# Patient Record
Sex: FEMALE | Race: WHITE | NOT HISPANIC OR LATINO | URBAN - METROPOLITAN AREA
[De-identification: names, ages, dates, MRNs, and addresses within clinical notes are randomized per-mention and may not be internally consistent; named-entity substitution may affect disease eponyms.]

---

## 2019-04-05 ENCOUNTER — APPOINTMENT (RX ONLY)
Dept: URBAN - METROPOLITAN AREA CLINIC 38 | Facility: CLINIC | Age: 64
Setting detail: DERMATOLOGY
End: 2019-04-05

## 2019-04-05 ENCOUNTER — APPOINTMENT (RX ONLY)
Dept: URBAN - METROPOLITAN AREA CLINIC 37 | Facility: CLINIC | Age: 64
Setting detail: DERMATOLOGY
End: 2019-04-05

## 2019-04-05 DIAGNOSIS — L72.0 EPIDERMAL CYST: ICD-10-CM

## 2019-04-05 DIAGNOSIS — L73.8 OTHER SPECIFIED FOLLICULAR DISORDERS: ICD-10-CM

## 2019-04-05 PROBLEM — L55.1 SUNBURN OF SECOND DEGREE: Status: ACTIVE | Noted: 2019-04-05

## 2019-04-05 PROBLEM — Z85.828 PERSONAL HISTORY OF OTHER MALIGNANT NEOPLASM OF SKIN: Status: ACTIVE | Noted: 2019-04-05

## 2019-04-05 PROBLEM — L23.7 ALLERGIC CONTACT DERMATITIS DUE TO PLANTS, EXCEPT FOOD: Status: ACTIVE | Noted: 2019-04-05

## 2019-04-05 PROBLEM — L70.0 ACNE VULGARIS: Status: ACTIVE | Noted: 2019-04-05

## 2019-04-05 PROBLEM — I10 ESSENTIAL (PRIMARY) HYPERTENSION: Status: ACTIVE | Noted: 2019-04-05

## 2019-04-05 PROCEDURE — ? INTRALESIONAL KENALOG

## 2019-04-05 PROCEDURE — 99024 POSTOP FOLLOW-UP VISIT: CPT | Mod: 25

## 2019-04-05 PROCEDURE — ? COUNSELING

## 2019-04-05 ASSESSMENT — LOCATION ZONE DERM
LOCATION ZONE: FACE
LOCATION ZONE: FACE

## 2019-04-05 ASSESSMENT — LOCATION DETAILED DESCRIPTION DERM
LOCATION DETAILED: RIGHT INFERIOR MEDIAL FOREHEAD
LOCATION DETAILED: RIGHT INFERIOR LATERAL MALAR CHEEK
LOCATION DETAILED: RIGHT INFERIOR MEDIAL FOREHEAD
LOCATION DETAILED: RIGHT INFERIOR LATERAL MALAR CHEEK

## 2019-04-05 ASSESSMENT — LOCATION SIMPLE DESCRIPTION DERM
LOCATION SIMPLE: RIGHT CHEEK
LOCATION SIMPLE: RIGHT FOREHEAD
LOCATION SIMPLE: RIGHT FOREHEAD
LOCATION SIMPLE: RIGHT CHEEK

## 2019-04-05 NOTE — PROCEDURE: INTRALESIONAL KENALOG
Administered By (Optional): Dr. Chapin
Medical Necessity Clause: This procedure was medically necessary because the lesions that were treated were:
Expiration Date For Kenalog (Optional): 05/2020
Kenalog Preparation: Kenalog
Ndc# For Kenalog Only: [id number]
Consent: The risks of atrophy were reviewed with the patient.
Lot # For Kenalog (Optional): [id number]
X Size Of Lesion In Cm (Optional): 0
Concentration Of Kenalog Solution Injected (Mg/Ml): 20.0
Detail Level: Detailed
Include Z78.9 (Other Specified Conditions Influencing Health Status) As An Associated Diagnosis?: No
Total Volume (Ccs): 0.3

## 2019-04-05 NOTE — PROCEDURE: INTRALESIONAL KENALOG
Ndc# For Kenalog Only: [id number]
Kenalog Preparation: Kenalog
Medical Necessity Clause: This procedure was medically necessary because the lesions that were treated were:
Include Z78.9 (Other Specified Conditions Influencing Health Status) As An Associated Diagnosis?: No
X Size Of Lesion In Cm (Optional): 0
Administered By (Optional): Dr. Pan
Consent: The risks of atrophy were reviewed with the patient.
Expiration Date For Kenalog (Optional): 05/2020
Detail Level: Detailed
Concentration Of Kenalog Solution Injected (Mg/Ml): 20.0
Total Volume (Ccs): 0.3
Lot # For Kenalog (Optional): [id number]

## 2019-04-25 ENCOUNTER — APPOINTMENT (RX ONLY)
Dept: URBAN - METROPOLITAN AREA CLINIC 37 | Facility: CLINIC | Age: 64
Setting detail: DERMATOLOGY
End: 2019-04-25

## 2019-04-25 ENCOUNTER — APPOINTMENT (RX ONLY)
Dept: URBAN - METROPOLITAN AREA CLINIC 38 | Facility: CLINIC | Age: 64
Setting detail: DERMATOLOGY
End: 2019-04-25

## 2019-04-25 DIAGNOSIS — L72.0 EPIDERMAL CYST: ICD-10-CM

## 2019-04-25 PROCEDURE — ? INTRALESIONAL KENALOG

## 2019-04-25 PROCEDURE — 99024 POSTOP FOLLOW-UP VISIT: CPT

## 2019-04-25 PROCEDURE — ? COUNSELING

## 2019-04-25 ASSESSMENT — LOCATION SIMPLE DESCRIPTION DERM
LOCATION SIMPLE: RIGHT CHEEK
LOCATION SIMPLE: RIGHT CHEEK

## 2019-04-25 ASSESSMENT — LOCATION ZONE DERM
LOCATION ZONE: FACE
LOCATION ZONE: FACE

## 2019-04-25 ASSESSMENT — LOCATION DETAILED DESCRIPTION DERM
LOCATION DETAILED: RIGHT CENTRAL MALAR CHEEK
LOCATION DETAILED: RIGHT CENTRAL MALAR CHEEK

## 2019-04-25 NOTE — PROCEDURE: INTRALESIONAL KENALOG
X Size Of Lesion In Cm (Optional): 0
Consent: The risks of atrophy were reviewed with the patient.
Ndc# For Kenalog Only: [id number]
Expiration Date For Kenalog (Optional): 08/2019
Total Volume (Ccs): 0.1
Medical Necessity Clause: This procedure was medically necessary because the lesions that were treated were:
Kenalog Preparation: Kenalog
Administered By (Optional): Dr. Chapin
Lot # For Kenalog (Optional): [id number]
Detail Level: Detailed
Concentration Of Kenalog Solution Injected (Mg/Ml): 40.0
Include Z78.9 (Other Specified Conditions Influencing Health Status) As An Associated Diagnosis?: No

## 2019-04-25 NOTE — PROCEDURE: INTRALESIONAL KENALOG
Kenalog Preparation: Kenalog
Administered By (Optional): Dr. Pan
Consent: The risks of atrophy were reviewed with the patient.
Total Volume (Ccs): 0.1
Detail Level: Detailed
Concentration Of Kenalog Solution Injected (Mg/Ml): 40.0
X Size Of Lesion In Cm (Optional): 0
Include Z78.9 (Other Specified Conditions Influencing Health Status) As An Associated Diagnosis?: No
Ndc# For Kenalog Only: [id number]
Lot # For Kenalog (Optional): [id number]
Medical Necessity Clause: This procedure was medically necessary because the lesions that were treated were:
Expiration Date For Kenalog (Optional): 08/2019

## 2020-03-05 ENCOUNTER — APPOINTMENT (RX ONLY)
Dept: URBAN - METROPOLITAN AREA CLINIC 37 | Facility: CLINIC | Age: 65
Setting detail: DERMATOLOGY
End: 2020-03-05

## 2020-03-05 ENCOUNTER — APPOINTMENT (RX ONLY)
Dept: URBAN - METROPOLITAN AREA CLINIC 38 | Facility: CLINIC | Age: 65
Setting detail: DERMATOLOGY
End: 2020-03-05

## 2020-03-05 DIAGNOSIS — L72.0 EPIDERMAL CYST: ICD-10-CM

## 2020-03-05 PROCEDURE — ? COUNSELING

## 2020-03-05 PROCEDURE — ? INTRALESIONAL KENALOG

## 2020-03-05 PROCEDURE — 99024 POSTOP FOLLOW-UP VISIT: CPT

## 2020-03-05 ASSESSMENT — LOCATION DETAILED DESCRIPTION DERM
LOCATION DETAILED: RIGHT INFERIOR CENTRAL MALAR CHEEK
LOCATION DETAILED: RIGHT INFERIOR CENTRAL MALAR CHEEK

## 2020-03-05 ASSESSMENT — LOCATION SIMPLE DESCRIPTION DERM
LOCATION SIMPLE: RIGHT CHEEK
LOCATION SIMPLE: RIGHT CHEEK

## 2020-03-05 ASSESSMENT — LOCATION ZONE DERM
LOCATION ZONE: FACE
LOCATION ZONE: FACE

## 2020-03-05 NOTE — PROCEDURE: INTRALESIONAL KENALOG
Lot # For Kenalog (Optional): [id number]
X Size Of Lesion In Cm (Optional): 0
Medical Necessity Clause: This procedure was medically necessary because the lesions that were treated were:
Concentration Of Kenalog Solution Injected (Mg/Ml): 40.0
Administered By (Optional): Dr. Pan
Ndc# For Kenalog Only: [id number]
Detail Level: Detailed
Include Z78.9 (Other Specified Conditions Influencing Health Status) As An Associated Diagnosis?: No
Consent: The risks of atrophy were reviewed with the patient.
Total Volume (Ccs): 1.5
Kenalog Preparation: Kenalog
Expiration Date For Kenalog (Optional): 08/2021

## 2020-03-05 NOTE — PROCEDURE: INTRALESIONAL KENALOG
Medical Necessity Clause: This procedure was medically necessary because the lesions that were treated were:
Total Volume (Ccs): 1.5
X Size Of Lesion In Cm (Optional): 0
Consent: The risks of atrophy were reviewed with the patient.
Lot # For Kenalog (Optional): [id number]
Kenalog Preparation: Kenalog
Expiration Date For Kenalog (Optional): 08/2021
Include Z78.9 (Other Specified Conditions Influencing Health Status) As An Associated Diagnosis?: No
Ndc# For Kenalog Only: [id number]
Concentration Of Kenalog Solution Injected (Mg/Ml): 40.0
Detail Level: Detailed
Administered By (Optional): Dr. Chapin

## 2020-04-17 ENCOUNTER — APPOINTMENT (RX ONLY)
Dept: URBAN - METROPOLITAN AREA CLINIC 38 | Facility: CLINIC | Age: 65
Setting detail: DERMATOLOGY
End: 2020-04-17

## 2020-04-17 ENCOUNTER — APPOINTMENT (RX ONLY)
Dept: URBAN - METROPOLITAN AREA CLINIC 37 | Facility: CLINIC | Age: 65
Setting detail: DERMATOLOGY
End: 2020-04-17

## 2020-04-17 DIAGNOSIS — L72.0 EPIDERMAL CYST: ICD-10-CM

## 2020-04-17 PROCEDURE — ? INCISION AND DRAINAGE

## 2020-04-17 PROCEDURE — 10060 I&D ABSCESS SIMPLE/SINGLE: CPT

## 2020-04-17 ASSESSMENT — LOCATION DETAILED DESCRIPTION DERM
LOCATION DETAILED: RIGHT CENTRAL MALAR CHEEK
LOCATION DETAILED: RIGHT CENTRAL MALAR CHEEK

## 2020-04-17 ASSESSMENT — LOCATION SIMPLE DESCRIPTION DERM
LOCATION SIMPLE: RIGHT CHEEK
LOCATION SIMPLE: RIGHT CHEEK

## 2020-04-17 ASSESSMENT — LOCATION ZONE DERM
LOCATION ZONE: FACE
LOCATION ZONE: FACE

## 2020-04-17 NOTE — PROCEDURE: MIPS QUALITY
Detail Level: Detailed
Quality 431: Preventive Care And Screening: Unhealthy Alcohol Use - Screening: Patient screened for unhealthy alcohol use using a single question and scores less than 2 times per year
Quality 226: Preventive Care And Screening: Tobacco Use: Screening And Cessation Intervention: Patient screened for tobacco use and is an ex/non-smoker
Quality 47: Advance Care Plan: Advance Care Planning discussed and documented in the medical record; patient did not wish or was not able to name a surrogate decision maker or provide an advance care plan.
Quality 130: Documentation Of Current Medications In The Medical Record: Current Medications Documented
Quality 110: Preventive Care And Screening: Influenza Immunization: Influenza Immunization previously received during influenza season

## 2020-04-17 NOTE — PROCEDURE: INCISION AND DRAINAGE
Consent was obtained and risks were reviewed including but not limited to delayed wound healing, infection, need for multiple I and D's, and pain.
Drainage Type?: cyst-like
Curette: Yes
Drainage Amount?: minimal
Method: 15 blade
Wound Care: Petrolatum
Include Sutures?: No
Epidermal Closure: simple interrupted
Detail Level: Detailed
Anesthesia Type: 1% lidocaine with epinephrine
Curette Text (Optional): After the contents were expressed a curette was used to partially remove the cyst wall.
Suture Text: The incision was partially closed with
Dressing: dry sterile dressing
Size Of Lesion In Cm (Optional But May Be Required For Some Insurances): 1
Anesthesia Volume In Cc: 6
Preparation Text: The area was prepped in the usual clean fashion.
Epidermal Sutures: 4-0 Ethilon
Post-Care Instructions: I reviewed with the patient in detail post-care instructions. Patient should keep wound covered and call the office should any redness, pain, swelling or worsening occur.
Lesion Type: Abscess

## 2020-04-17 NOTE — PROCEDURE: MIPS QUALITY
Quality 47: Advance Care Plan: Advance Care Planning discussed and documented in the medical record; patient did not wish or was not able to name a surrogate decision maker or provide an advance care plan.
Quality 431: Preventive Care And Screening: Unhealthy Alcohol Use - Screening: Patient screened for unhealthy alcohol use using a single question and scores less than 2 times per year
Quality 226: Preventive Care And Screening: Tobacco Use: Screening And Cessation Intervention: Patient screened for tobacco use and is an ex/non-smoker
Quality 130: Documentation Of Current Medications In The Medical Record: Current Medications Documented
Detail Level: Detailed
Quality 110: Preventive Care And Screening: Influenza Immunization: Influenza Immunization previously received during influenza season

## 2020-04-17 NOTE — PROCEDURE: INCISION AND DRAINAGE
Detail Level: Detailed
Lesion Type: Abscess
Method: 15 blade
Curette: Yes
Include Sutures?: No
Anesthesia Type: 1% lidocaine with epinephrine
Anesthesia Volume In Cc: 6
Size Of Lesion In Cm (Optional But May Be Required For Some Insurances): 1
Drainage Amount?: minimal
Drainage Type?: cyst-like
Wound Care: Petrolatum
Dressing: dry sterile dressing
Epidermal Sutures: 4-0 Ethilon
Epidermal Closure: simple interrupted
Suture Text: The incision was partially closed with
Preparation Text: The area was prepped in the usual clean fashion.
Curette Text (Optional): After the contents were expressed a curette was used to partially remove the cyst wall.
Consent was obtained and risks were reviewed including but not limited to delayed wound healing, infection, need for multiple I and D's, and pain.
Post-Care Instructions: I reviewed with the patient in detail post-care instructions. Patient should keep wound covered and call the office should any redness, pain, swelling or worsening occur.

## 2021-03-29 ENCOUNTER — APPOINTMENT (RX ONLY)
Dept: URBAN - METROPOLITAN AREA CLINIC 37 | Facility: CLINIC | Age: 66
Setting detail: DERMATOLOGY
End: 2021-03-29

## 2021-03-29 ENCOUNTER — APPOINTMENT (RX ONLY)
Dept: URBAN - METROPOLITAN AREA CLINIC 38 | Facility: CLINIC | Age: 66
Setting detail: DERMATOLOGY
End: 2021-03-29

## 2021-03-29 DIAGNOSIS — L30.8 OTHER SPECIFIED DERMATITIS: ICD-10-CM

## 2021-03-29 PROBLEM — L23.7 ALLERGIC CONTACT DERMATITIS DUE TO PLANTS, EXCEPT FOOD: Status: ACTIVE | Noted: 2021-03-29

## 2021-03-29 PROCEDURE — ? PRESCRIPTION

## 2021-03-29 PROCEDURE — ? COUNSELING

## 2021-03-29 PROCEDURE — ? ADDITIONAL NOTES

## 2021-03-29 PROCEDURE — 99213 OFFICE O/P EST LOW 20 MIN: CPT

## 2021-03-29 RX ORDER — BETAMETHASONE DIPROPIONATE 0.5 MG/G
ONCE CREAM, AUGMENTED TOPICAL TWICE DAILY
Qty: 1 | Refills: 0 | Status: ERX | COMMUNITY
Start: 2021-03-29

## 2021-03-29 RX ADMIN — BETAMETHASONE DIPROPIONATE ONCE: 0.5 CREAM, AUGMENTED TOPICAL at 00:00

## 2021-03-29 NOTE — PROCEDURE: COUNSELING
Detail Level: Detailed
Patient Specific Counseling (Will Not Stick From Patient To Patient): Appears to be a localized urticarial injection site reaction. She denies any systemic symptoms at any point post injection. This is s/p second injection and so should not have upcoming reexposure risk. Will treat topically and she'll return if it worsens or new Sx develop.

## 2021-03-29 NOTE — PROCEDURE: ADDITIONAL NOTES
Additional Notes: We discussed with patient that this good her her body being hypersensitive to the second covid vaccine. We will treat topically to calm the inflammation down.
Detail Level: Detailed
Render Risk Assessment In Note?: no

## 2021-03-29 NOTE — HPI: RASH
What Type Of Note Output Would You Prefer (Optional)?: Standard Output
How Severe Is Your Rash?: mild
Is This A New Presentation, Or A Follow-Up?: Rash
Additional History: patient states that she got the second Covid vaccine on 3/24 and got a rash immediately afterwards

## 2021-03-29 NOTE — PROCEDURE: ADDITIONAL NOTES
Detail Level: Detailed
Additional Notes: We discussed with patient that this good her her body being hypersensitive to the second covid vaccine. We will treat topically to calm the inflammation down.
Render Risk Assessment In Note?: no

## 2021-03-29 NOTE — PROCEDURE: COUNSELING
Patient Specific Counseling (Will Not Stick From Patient To Patient): Appears to be a localized urticarial injection site reaction. She denies any systemic symptoms at any point post injection. This is s/p second injection and so should not have upcoming reexposure risk. Will treat topically and she'll return if it worsens or new Sx develop.
Detail Level: Detailed